# Patient Record
Sex: MALE | Race: WHITE | HISPANIC OR LATINO | Employment: STUDENT | URBAN - METROPOLITAN AREA
[De-identification: names, ages, dates, MRNs, and addresses within clinical notes are randomized per-mention and may not be internally consistent; named-entity substitution may affect disease eponyms.]

---
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
## 2025-07-11 ENCOUNTER — OFFICE VISIT (OUTPATIENT)
Dept: URGENT CARE | Facility: CLINIC | Age: 20
End: 2025-07-11
Payer: OTHER MISCELLANEOUS

## 2025-07-11 VITALS
RESPIRATION RATE: 20 BRPM | WEIGHT: 180 LBS | TEMPERATURE: 97 F | HEART RATE: 74 BPM | HEIGHT: 71 IN | DIASTOLIC BLOOD PRESSURE: 80 MMHG | SYSTOLIC BLOOD PRESSURE: 124 MMHG | OXYGEN SATURATION: 98 % | BODY MASS INDEX: 25.2 KG/M2

## 2025-07-11 DIAGNOSIS — Z02.83 ENCOUNTER FOR DRUG SCREENING: ICD-10-CM

## 2025-07-11 DIAGNOSIS — S92.401A CLOSED DISPLACED FRACTURE OF PHALANX OF RIGHT GREAT TOE, UNSPECIFIED PHALANX, INITIAL ENCOUNTER: Primary | ICD-10-CM

## 2025-07-11 DIAGNOSIS — M79.674 GREAT TOE PAIN, RIGHT: ICD-10-CM

## 2025-07-11 DIAGNOSIS — T14.90XA TRAUMA: ICD-10-CM

## 2025-07-11 LAB
CTP QC/QA: YES
POC 10 PANEL DRUG SCREEN: NEGATIVE

## 2025-07-11 NOTE — PROGRESS NOTES
Subjective:      Patient ID: Surjit Choudhury is a 19 y.o. male.    Chief Complaint: Toe Injury    Patient's place of employment - [employer]  Patient's job title -   Date of injury - 7/11/25  Body part injured including left or right - R great toe   Injury Mechanism - direct blow/ concrete block   What they were doing when they got hurt - moving a concrete block when it slipped and fell on his toe  What they did immediately after - contacted supervisor  Pain scale right now - 0    Reports numbness on right great toe.     Toe Injury  This is a new problem. The current episode started today. The problem occurs constantly. Associated symptoms include numbness.       Musculoskeletal:  Positive for pain and trauma. Negative for abnormal ROM of joint.   Neurological:  Positive for numbness. Negative for tingling.     Objective:     Physical Exam  Vitals and nursing note reviewed.   Constitutional:       General: He is not in acute distress.     Appearance: He is not ill-appearing, toxic-appearing or diaphoretic.   Cardiovascular:      Rate and Rhythm: Normal rate and regular rhythm.      Pulses: Normal pulses.      Heart sounds: Normal heart sounds.   Pulmonary:      Effort: Pulmonary effort is normal. No respiratory distress.      Breath sounds: Normal breath sounds. No wheezing, rhonchi or rales.   Chest:      Chest wall: No tenderness.   Musculoskeletal:         General: Swelling present.      Cervical back: Neck supple.   Skin:     Capillary Refill: Capillary refill takes less than 2 seconds.      Findings: Bruising (right great toe) present.   Neurological:      Mental Status: He is alert and oriented to person, place, and time.      Sensory: No sensory deficit.      Motor: No weakness.   Psychiatric:         Mood and Affect: Mood normal.         Behavior: Behavior normal.         Thought Content: Thought content normal.         Judgment: Judgment normal.      X-Ray Foot Complete 3 view Right  Result Date:  7/11/2025  EXAMINATION: XR FOOT COMPLETE 3 VIEW RIGHT CLINICAL HISTORY: . Injury, unspecified, initial encounter TECHNIQUE: AP, lateral, and oblique views of the right foot were performed. COMPARISON: None FINDINGS: Bones are well mineralized. Overall alignment is within normal limits.  Suspected acute nondisplaced fracture involving the lateral corner of the base of the 1st distal phalanx, with fracture plane extending to the IP joint, best seen on the frontal view.  Lisfranc articulation is congruent.  No dislocation or destructive osseous process.  Joint spaces appear relatively maintained. No subcutaneous emphysema or radiopaque foreign body.     First distal phalangeal base suspected acute nondisplaced fracture with intra-articular extension. Electronically signed by: Yuan Lynn MD Date:    07/11/2025 Time:    17:59      Assessment:      1. Closed displaced fracture of phalanx of right great toe, unspecified phalanx, initial encounter    2. Encounter for drug screening    3. Trauma    4. Great toe pain, right      Plan:     Patient Instructions   Return to clinic on Monday for occupational health follow up.      Return to clinic for no improvement in symptoms.  Report to the emergency room for worsening of symptoms.     Wear your cast, walking boot, or special hard-soled shoe to support your foot as you were told to. After a few weeks, you may be able to take off the cast or boot and tape your toe to the one next to it for support. This is called martin taping.  Prop your foot on pillows keeping it above the level of your heart. This may help lessen pain and swelling.  You can take pain medicines like acetaminophen, ibuprofen, or naproxen to help with pain.  Ice may help you ease pain and swelling.  Place an ice pack or a bag of frozen vegetables wrapped in a towel over the painful part. Never put ice right on the skin. Do not leave the ice on more than 10 to 15 minutes at a time. Use ice for the first 24 to  48 hours after an injury.  If you smoke, try to quit. Broken bones take longer to heal if you smoke.              No follow-ups on file.

## 2025-07-11 NOTE — LETTER
July 11, 2025      [facility] [place]  [place] [address]  Phone: [phone]  Fax: [phone]       Patient: Surjit Choudhury   YOB: 2005  Date of Visit: 07/11/2025    To Whom It May Concern:    Manuela Choudhury  was at [facility] on 07/11/2025. The patient may return to work/school on after occupational health clearance on 07/14/2025 with restrictions. If you have any questions or concerns, or if I can be of further assistance, please do not hesitate to contact me.    Sincerely,          YEFRI Rosa

## 2025-07-11 NOTE — LETTER
[facility] [place] [address]  Phone: [phone]  Fax: [phone]  [facility]: [phone]    Pt Name: Surjit Choudhury  Injury Date: 07/11/2025   Employee ID: [id number] Date of First Treatment: 07/11/2025   Company: [employer] reference to record [id number]      Appointment Time: 04:55 PM Arrived: 1722   Provider: YEFRI Rosa Time Out:1822     Office Treatment:   1. Great toe pain, right    2. Encounter for drug screening    3. Trauma    4. Closed displaced fracture of phalanx of right great toe, unspecified phalanx, initial encounter                     Return Appointment:  07/14/2025

## 2025-07-11 NOTE — PATIENT INSTRUCTIONS
Return to clinic on Monday for occupational health follow up.      Return to clinic for no improvement in symptoms.  Report to the emergency room for worsening of symptoms.     Wear your cast, walking boot, or special hard-soled shoe to support your foot as you were told to. After a few weeks, you may be able to take off the cast or boot and tape your toe to the one next to it for support. This is called martin taping.  Prop your foot on pillows keeping it above the level of your heart. This may help lessen pain and swelling.  You can take pain medicines like acetaminophen, ibuprofen, or naproxen to help with pain.  Ice may help you ease pain and swelling.  Place an ice pack or a bag of frozen vegetables wrapped in a towel over the painful part. Never put ice right on the skin. Do not leave the ice on more than 10 to 15 minutes at a time. Use ice for the first 24 to 48 hours after an injury.  If you smoke, try to quit. Broken bones take longer to heal if you smoke.

## 2025-07-14 ENCOUNTER — OFFICE VISIT (OUTPATIENT)
Dept: URGENT CARE | Facility: CLINIC | Age: 20
End: 2025-07-14
Payer: OTHER MISCELLANEOUS

## 2025-07-14 VITALS
RESPIRATION RATE: 17 BRPM | DIASTOLIC BLOOD PRESSURE: 65 MMHG | BODY MASS INDEX: 25.2 KG/M2 | HEIGHT: 71 IN | HEART RATE: 53 BPM | SYSTOLIC BLOOD PRESSURE: 122 MMHG | WEIGHT: 180 LBS | OXYGEN SATURATION: 99 %

## 2025-07-14 DIAGNOSIS — Z02.6 ENCOUNTER RELATED TO WORKER'S COMPENSATION CLAIM: ICD-10-CM

## 2025-07-14 DIAGNOSIS — S92.424A CLOSED NONDISPLACED FRACTURE OF DISTAL PHALANX OF RIGHT GREAT TOE, INITIAL ENCOUNTER: Primary | ICD-10-CM

## 2025-07-14 PROCEDURE — 99215 OFFICE O/P EST HI 40 MIN: CPT | Mod: 25,S$GLB,, | Performed by: STUDENT IN AN ORGANIZED HEALTH CARE EDUCATION/TRAINING PROGRAM

## 2025-07-14 PROCEDURE — 26720 TREAT FINGER FRACTURE EACH: CPT | Mod: S$GLB,,, | Performed by: STUDENT IN AN ORGANIZED HEALTH CARE EDUCATION/TRAINING PROGRAM

## 2025-07-14 NOTE — PROGRESS NOTES
Subjective:      Patient ID: Surjit Choudhury is a 20 y.o. male.    Chief Complaint: Foot Injury    Patient's place of employment - [employer]  Patient's job title -   Date of injury - 7/11/25  Body part injured including left or right - R great toe   Injury Mechanism - direct blow/ concrete block   What they were doing when they got hurt - moving a concrete block when it slipped and fell on his toe  What they did immediately after - contacted supervisor  Pain scale right now - 0/10    KW    See MA note above. Paola LOMAS note: Lisseth Zuñiga,     Patient confirms the above history. He was [place] in  on the [facility] and had an xray that confirmed a great toe fracture. His toes were martin taped and he was provided with a walking boot. Taking OTC aleve x 3 daily to help with pain which controls it well. He has noted purplish discoloration to the toe since the injury. Also says he feels numbness. Has been icing intermittently with relief. 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 No other reported new complaints or concerns.             Musculoskeletal:  Positive for pain and trauma. Negative for abnormal ROM of joint.   Skin:  Negative for erythema.   Neurological:  Positive for numbness. Negative for tingling.     Objective:     Physical Exam  Vitals and nursing note reviewed.   Constitutional:       General: He is not in acute distress.     Appearance: Normal appearance. He is not ill-appearing.   HENT:      Head: Normocephalic.   Eyes:      Conjunctiva/sclera: Conjunctivae normal.   Pulmonary:      Effort: No respiratory distress.   Musculoskeletal:      Right foot: Decreased range of motion. Swelling and bony tenderness present. No deformity.      Comments: Swelling and ecchymosis right great toe DIP joint with decreased ROM. Sensation intact diffusely with light touch equal to other digits.    Skin:     General: Skin is warm and dry.      Findings: No erythema.    Neurological:      Mental Status: He is alert and oriented to person, place, and time.      GCS: GCS eye subscore is 4. GCS verbal subscore is 5. GCS motor subscore is 6.      Sensory: No sensory deficit.      Gait: Gait abnormal (wearing walking boot).   Psychiatric:         Attention and Perception: Attention normal.         Mood and Affect: Mood normal.         Behavior: Behavior normal.        Assessment:      1. Closed nondisplaced fracture of distal phalanx of right great toe, initial encounter    2. Encounter related to worker's compensation claim    X-Ray Foot Complete 3 view Right  Result Date: 7/11/2025  EXAMINATION: XR FOOT COMPLETE 3 VIEW RIGHT CLINICAL HISTORY: . Injury, unspecified, initial encounter TECHNIQUE: AP, lateral, and oblique views of the right foot were performed. COMPARISON: None FINDINGS: Bones are well mineralized. Overall alignment is within normal limits.  Suspected acute nondisplaced fracture involving the lateral corner of the base of the 1st distal phalanx, with fracture plane extending to the IP joint, best seen on the frontal view.  Lisfranc articulation is congruent.  No dislocation or destructive osseous process.  Joint spaces appear relatively maintained. No subcutaneous emphysema or radiopaque foreign body.     First distal phalangeal base suspected acute nondisplaced fracture with intra-articular extension. Electronically signed by: Yuan Lynn MD Date:    07/11/2025 Time:    17:59      Plan:     Reviewed dx and the expected course/duration of symptoms. Given non displaced fracture with less than 25% intra-articular involvement, will continue to manage in clinic. Discussed martin taping which I performed for the patient today. Advised f/u in 4 weeks for repeat imaging and re-eval. He will continue wear of walking boot and martin taping until then. Strict return precautions for any acutely worsening symptoms. Discussed continued use of OTC meds and proper dosing.      Clinic/Emergency department return precautions were given, can return to [facility] before scheduled follow-up appointment if notes worsening/aggravation of symptoms. All questions and concerns were addressed prior to discharge. Plan was developed with active input from the patient and they verbalized understanding of and agreement with the POC.     Note was dictated with voice recognition software, please excuse any grammatical errors.       Patient Instructions:  (Keep toes buddy taped and continue wear of walking boot. Ok to take OTC Aleve every 12 hours as needed for pain. Do not exceed 4 tablets daily.)   Restrictions: No lifting/pushing/pulling more than 50 lbs, No Prolonged standing/walking (Allow wear of walking boot at all times.)  Follow up in about 4 weeks (around 8/11/2025).    I spent a total of 41 minutes on the day of the visit. This includes face to face time and non-face to face time preparing to see the patient (eg, review of tests, prior records/notes), obtaining and/or reviewing separately obtained history, documenting clinical information in the electronic or other health record, independently interpreting results and communicating results to the patient and manager.

## 2025-07-14 NOTE — LETTER
[facility] - [place]  [place] [address]  Phone: [phone]  Fax: [phone]  [facility]: [email or web address]    Pt Name: Surjit Choudhury  Injury Date: 07/11/2025   Employee ID: [id number] Date of First Treatment: 07/14/2025   Company: [employer]      Appointment Time: 08:40 AM Arrived: 8:48 AM   Provider: Janel Rodriguez MD Time Out: 9:45 AM     Office Treatment:   1. Closed nondisplaced fracture of distal phalanx of right great toe, initial encounter    2. Encounter related to worker's compensation claim          Patient Instructions:  (Keep toes buddy taped and continue wear of walking boot. Ok to take OTC Aleve every 12 hours as needed for pain. Do not exceed 4 tablets daily.)        Restrictions: No lifting/pushing/pulling more than 50 lbs, No Prolonged standing/walking (Allow wear of walking boot at all times.)     Return Appointment: 8/11/2025 at 9:00 AM    KW

## 2025-08-11 ENCOUNTER — OFFICE VISIT (OUTPATIENT)
Dept: URGENT CARE | Facility: CLINIC | Age: 20
End: 2025-08-11
Payer: OTHER MISCELLANEOUS

## 2025-08-11 VITALS
SYSTOLIC BLOOD PRESSURE: 121 MMHG | RESPIRATION RATE: 17 BRPM | HEIGHT: 71 IN | WEIGHT: 180 LBS | OXYGEN SATURATION: 98 % | HEART RATE: 57 BPM | BODY MASS INDEX: 25.2 KG/M2 | DIASTOLIC BLOOD PRESSURE: 77 MMHG

## 2025-08-11 DIAGNOSIS — S92.424D CLOSED NONDISPLACED FRACTURE OF DISTAL PHALANX OF RIGHT GREAT TOE WITH ROUTINE HEALING, SUBSEQUENT ENCOUNTER: Primary | ICD-10-CM

## 2025-08-11 DIAGNOSIS — Z02.6 ENCOUNTER RELATED TO WORKER'S COMPENSATION CLAIM: ICD-10-CM

## 2025-08-11 PROCEDURE — 26720 TREAT FINGER FRACTURE EACH: CPT | Mod: S$GLB,,, | Performed by: STUDENT IN AN ORGANIZED HEALTH CARE EDUCATION/TRAINING PROGRAM

## 2025-08-11 PROCEDURE — 73660 X-RAY EXAM OF TOE(S): CPT | Mod: RT,S$GLB,, | Performed by: RADIOLOGY

## 2025-08-11 PROCEDURE — 99214 OFFICE O/P EST MOD 30 MIN: CPT | Mod: 25,S$GLB,, | Performed by: STUDENT IN AN ORGANIZED HEALTH CARE EDUCATION/TRAINING PROGRAM
